# Patient Record
Sex: FEMALE | Race: WHITE | NOT HISPANIC OR LATINO | URBAN - NONMETROPOLITAN AREA
[De-identification: names, ages, dates, MRNs, and addresses within clinical notes are randomized per-mention and may not be internally consistent; named-entity substitution may affect disease eponyms.]

---

## 2020-10-12 ENCOUNTER — OFFICE VISIT (OUTPATIENT)
Dept: URBAN - NONMETROPOLITAN AREA CLINIC 13 | Facility: CLINIC | Age: 80
End: 2020-10-12
Payer: MEDICARE

## 2020-10-12 ENCOUNTER — DASHBOARD ENCOUNTERS (OUTPATIENT)
Age: 80
End: 2020-10-12

## 2020-10-12 DIAGNOSIS — K44.9 HIATAL HERNIA: ICD-10-CM

## 2020-10-12 DIAGNOSIS — Z86.010 PERSONAL HISTORY OF COLONIC POLYPS: ICD-10-CM

## 2020-10-12 DIAGNOSIS — K21.9 CHRONIC GERD: ICD-10-CM

## 2020-10-12 PROBLEM — 428283002: Status: ACTIVE | Noted: 2020-10-12

## 2020-10-12 PROBLEM — 235595009: Status: ACTIVE | Noted: 2020-10-12

## 2020-10-12 PROCEDURE — 99203 OFFICE O/P NEW LOW 30 MIN: CPT | Performed by: INTERNAL MEDICINE

## 2020-10-12 PROCEDURE — G8420 CALC BMI NORM PARAMETERS: HCPCS | Performed by: INTERNAL MEDICINE

## 2020-10-12 PROCEDURE — G8427 DOCREV CUR MEDS BY ELIG CLIN: HCPCS | Performed by: INTERNAL MEDICINE

## 2020-10-12 NOTE — HPI-TODAY'S VISIT:
Ms. Pretty Latif is a pleasant 78 y/o F referred to GI clinic by  for screening colonoscopy.  She has a history of chronic GERD and a hiatus hernia.  She reports that she is doing well.  She is due for a 5 year repeat due to personal history of polyps.  Her last colonoscopy was in early 2015 with Dr. Sparorw and she had a rectal polyp.  She had an EGD in 2004 showing small hiatus hernia.  She is on omeprazole and this controls her reflux.  If she misses a few doses she gets breakthrough heartburn.

## 2020-11-24 ENCOUNTER — OFFICE VISIT (OUTPATIENT)
Dept: URBAN - NONMETROPOLITAN AREA SURGERY CENTER 1 | Facility: SURGERY CENTER | Age: 80
End: 2020-11-24

## 2021-04-22 ENCOUNTER — OFFICE VISIT (OUTPATIENT)
Dept: URBAN - NONMETROPOLITAN AREA CLINIC 2 | Facility: CLINIC | Age: 81
End: 2021-04-22